# Patient Record
Sex: MALE | Race: WHITE
[De-identification: names, ages, dates, MRNs, and addresses within clinical notes are randomized per-mention and may not be internally consistent; named-entity substitution may affect disease eponyms.]

---

## 2019-11-27 ENCOUNTER — HOSPITAL ENCOUNTER (OUTPATIENT)
Dept: HOSPITAL 46 - ED | Age: 39
End: 2019-11-27
Attending: SURGERY
Payer: COMMERCIAL

## 2019-11-27 VITALS — HEIGHT: 68 IN | WEIGHT: 180.01 LBS | BODY MASS INDEX: 27.28 KG/M2

## 2019-11-27 DIAGNOSIS — K42.9: ICD-10-CM

## 2019-11-27 DIAGNOSIS — Z87.891: ICD-10-CM

## 2019-11-27 DIAGNOSIS — B19.20: ICD-10-CM

## 2019-11-27 DIAGNOSIS — K80.12: Primary | ICD-10-CM

## 2019-11-27 DIAGNOSIS — F41.9: ICD-10-CM

## 2019-11-27 DIAGNOSIS — K74.60: ICD-10-CM

## 2019-11-27 DIAGNOSIS — K21.9: ICD-10-CM

## 2019-11-27 PROCEDURE — 0FT44ZZ RESECTION OF GALLBLADDER, PERCUTANEOUS ENDOSCOPIC APPROACH: ICD-10-PCS | Performed by: SURGERY

## 2019-11-27 NOTE — NUR
11/27/19 1330 Sheets,[place]
1318 PT ARRIVED TO PACU ON 6L VIA MASK, RESP EVEN AND UNLABORED. ORAL
AIRWAY IN PLACE. 4 LAP SITES NOTED AND ON ONE CEASAR DRAIN OUT OF ONE LAP
SITE.

## 2019-11-27 NOTE — OR
[facility]
                                    [address], [place]  [address]
_________________________________________________________________________________________
                                                                 Signed   
 
 
DATE OF OPERATION:
11/27/2019
 
SURGEON:
Angelo Mantilla MD
 
PREOPERATIVE DIAGNOSIS:
Acute-on-chronic cholecystitis and cholelithiasis.
 
POSTOPERATIVE DIAGNOSES:
1. Acute-on-chronic cholecystitis and cholelithiasis.
2. Umbilical hernia, 7 mm.
 
PROCEDURES PERFORMED:
1. Laparoscopic cholecystectomy without intraoperative cholangiogram (prolonged and
difficult). 
2. Subhepatic drain placement.
3. Primary umbilical hernia repair.
 
ESTIMATED BLOOD LOSS:
Minimal.
 
FINDINGS:
Mann had a gallbladder twice as long as normal.  It was quite dilated and full of
clear mucoid fluid.  He did have two large gallstones.  One was obstructing the neck of
the gallbladder.  He is slight of build and his gallbladder was extremely deep
underneath the liver.  We could not elevate his gallbladder up over the edge of the
liver in this regard.  Consequently, we had to add a fan retractor through the
epigastric midline and bring in an extra nurse to help hold retraction.  He has been on
Toradol for three days and he did bleed from his trocar sites and a little bit around
the dissection of triangle of Calot.  All these things combined to make his procedure
prolonged and difficult.  Because of the bleeding, they had a triangle of Calot in the
depth of the gallbladder underneath the liver.  We abandoned the intraoperative
cholangiogram.  Consequently, we did leave a subhepatic drain in place.  Indeed, the
gallbladder proved to be quite thickened and quite edematous as well. 
 
INDICATIONS:
Mann is a 39-year-old gentleman from our [facility].  He
has a prior history hepatitis C virus with associated cirrhosis of the liver.  He was
treated for the hepatitis C and apparently the viral count is not detectable.  He is
known to have at least one if not two stones in his gallbladder from prior ultrasounds.
The last three days he has had significant right upper quadrant abdominal pain with
 
    Electronically Signed By: ANGELO MANTILLA MD  11/27/19 2229
_________________________________________________________________________________________
PATIENT NAME:     MANN LANE                    
MEDICAL RECORD #: [id number]            OPERATIVE REPORT              
          ACCT #: [id number]  
DATE OF BIRTH:   [birth year]            REPORT #: [id number]      
PHYSICIAN:        ANGELO MANTILLA MD             
PCP:              CHAVEZ STORY MD              
REPORT IS CONFIDENTIAL AND NOT TO BE RELEASED WITHOUT AUTHORIZATION
 
 
                                  [facility]
                                    [address], [place]  [address]
_________________________________________________________________________________________
                                                                 Signed   
 
 
nausea.  He has been on Toradol to help in that regard.  His primary care provider
called me this morning and we had him come into the emergency room.  He is feeling much
better with some Dilaudid.  His vital signs were stable.  His white count was 10.2, and
liver function tests were fine.  His last ultrasound was just over a year ago.  We went
ahead and repeated it today.  He has a 1.9 cm stone and a 1.7 cm stone in the
gallbladder.  One is stuck in the neck of the gallbladder.  The gallbladder wall
slightly thickened around 4 mm.  The common bile duct was unremarkable.  There was no
concern about the hepatic parenchyma.  There was no evidence of any portal hypertension.
 No pericholecystic fluid.  I had met with Mann in the emergency room.  I reviewed all
this with him in detail.  We reviewed the location and function of the gallbladder which
he is quite familiar with.  We reviewed laparoscopic versus open cholecystectomy.  He
understands expected intraop and postop course.  We did review the risks including, but
not limited to bleeding, infection, scarring, change in contour of the skin, damage to
bowel, damage to the main bile duct, incisional hernias and other unforeseen
comorbidities.  He had expressed understanding and wished to proceed. 
 
PROCEDURE NOTE:
Sridhar was taken in the operating room and placed in the supine position under general
endotracheal tube anesthesia.  He was given preoperative antibiotics along with
subcutaneous heparin.  SCDs were utilized.  He was then prepped and draped in usual
sterile fashion.  All trocars were placed in their usual positions under direct
visualization of the camera without difficulty.  We realized that he had a small
umbilical hernia.  Consequently, we placed our Erlinda trocar through the umbilical
fascial defect.  After this, the gallbladder was grasped and elevated in the right upper
quadrant.  We realized immediately he had a very dilated thickened very long
gallbladder.  It was extremely deep underneath the liver.  We simply could not bring the
gallbladder up and out over the edge of the liver.  We did enter the gallbladder near
the fundus.  We suctioned out all the thick mucoid fluid.  Even then we could not get
the gallbladder up elevated enough to completely dissect out the triangle of Calot.
Consequently, we had to add the fan retractor through his mid epigastric midline and
have an additional nurse scrub and to hold the fan retractor so that the colon will be
down and out of the way.  We carefully dissected out the triangle of Calot.  We noticed
he had a little more bleeding than usual from his trocar sites as well as the
dissection.  Consequently, we abandoned our intraoperative cholangiogram and went ahead
and used two clips across the cystic duct and divided that sharply.  We then placed 2
clips across the cystic artery and it was divided as well.  It then took us extra time
to very carefully and slowly dissect out the gallbladder from deep underneath the liver
with the help of the cautery.  In the end, the case took twice as long as usual.  He had
a significant amount of edema to the gallbladder wall and the gallbladder wall was
thickened.  In due time, we had the gallbladder free and placed into our EndoCatch bag.
The right upper quadrant was then irrigated and suctioned out until clear.  We used a #7
flat Shade drain.  We brought that into the subhepatic space and held it in place at the
 
    Electronically Signed By: ANGELO MANTILLA MD  11/27/19 2229
_________________________________________________________________________________________
PATIENT NAME:     MANN LANE                    
MEDICAL RECORD #: [id number]            OPERATIVE REPORT              
          ACCT #: [id number]  
DATE OF BIRTH:   [birth year]            REPORT #: [id number]      
PHYSICIAN:        ANGELO MANTILLA MD             
PCP:              CHAVEZ STORY MD              
REPORT IS CONFIDENTIAL AND NOT TO BE RELEASED WITHOUT AUTHORIZATION
 
 
                                  [facility]
                                    [address], [place]  [address]
_________________________________________________________________________________________
                                                                 Signed   
 
 
right most lateral 5 mm subcostal trocar site with a 2-0 nylon suture.  We used our
laparoscopic suturing device and then to pass 0 Vicryl suture on either side of the
fascia of the subxiphoid and mid epigastric trocar sites and we closed those down
primarily.  After this all the gas was allowed to escape and we had enlarged the
umbilical fascial defect about 4 mm maybe 5 at the most to bring the gallbladder out
with the stones.  The gallbladder was opened on the back table by our circulating nurse
and again pictures were taken for photodocumentation.  We closed his umbilical fascial
defect transversely with a running #1 prolene suture.  The umbilical skin was held down
the midline fascia with an interrupted 2-0 PDS suture.  Local anesthetic was injected
into all trocar sites.  Each trocar site was irrigated and suctioned out until clear.
The skin and dermis of each trocar site was closed with interrupted 3-0 subcuticular
Monocryl and sutures.  Dry gauze and tape were applied to 
all incisions.  After this, Mann was awakened from his anesthesia, extubated in the
OR, and taken to recovery room in stable condition. 
 
 
 
            ________________________________________
            Angelo Mantilla MD 
 
 
ALB/MODL
Job #:  [id number]
DD:  11/27/2019 13:31:37
DT:  11/27/2019 18:34:01
 
cc:            MD Chavez Martinez MD
 
 
Copies:  ANGELO MANTILLA MD, LELAND MD
~
 
 
 
 
 
 
 
 
 
    Electronically Signed By: ANGELO MANTILLA MD  11/27/19 2229
_________________________________________________________________________________________
PATIENT NAME:     MANN LANE LAUREN                    
MEDICAL RECORD #: [id number]            OPERATIVE REPORT              
          ACCT #: [id number]  
DATE OF BIRTH:   [birth year]            REPORT #: [id number]      
PHYSICIAN:        ANGELO MANTILLA MD             
PCP:              CHAVEZ STORY MD              
REPORT IS CONFIDENTIAL AND NOT TO BE RELEASED WITHOUT AUTHORIZATION

## 2019-11-27 NOTE — CONS
[facility]
                                    [address], [place]  [address]
_________________________________________________________________________________________
                                                                 Signed   
 
 
DATE OF CONSULTATION:
11/27/2019
 
CHIEF COMPLAINT:
Right upper quadrant abdominal pain.
 
HISTORY OF PRESENT ILLNESS:
Mann is a 39-year-old gentleman from our [facility] [place].  He
was treated for his hepatitis C virus and said his viral counts went down to zero.  He
said he is known to have some mild cirrhosis of the liver.  He is known to have
cholelithiasis.  He said the last 3 days he has been having right upper quadrant
abdominal pain with nausea.  His primary care provider called me this morning and we had
him come over the emergency room for followup.  He was tender in the right upper
quadrant with a white count of 10.2.  Liver function tests were fine.  Ultrasound was
performed and looks like he has two stones in the gallbladder; one stuck in the neck
with gallbladder wall at 4 mm and a common bile duct unremarkable at 5 mm.  The liver
parenchyma itself looks fine and there is no portal hypertension.  His INR is pending,
but other lab seemed to be fine.  I do not have an albumin level at this point.  I was
asked to see him as a general surgeon on-call here in the emergency room. 
 
PAST MEDICAL HISTORY:
Hepatitis C virus and cirrhosis of the liver.
 
PAST SURGICAL HISTORY:
Laceration of his forehead, nose, lip, and chin repair.
 
SOCIAL HISTORY:
He quit smoking and drinking when he came into prison.  He is single and has twin boys.
He works as a  here in [place], but he is originally from [place] in
[place]. 
 
PRIMARY CARE PROVIDER:
Chavez Story MD.
 
FAMILY HISTORY:
Paternal grandmother had a stroke.  Parents and brothers and sisters all seemed to be
fine. 
 
REVIEW OF SYSTEMS:
He had 10 systems reviewed.  He seemed to be healthy otherwise.
 
MEDICATIONS:
Toradol, the last few days Cipro and Norco.
 
 
    Electronically Signed By: ANGELO MANTILLA MD  11/27/19 2229
_________________________________________________________________________________________
PATIENT NAME:     MANN LANE                    
MEDICAL RECORD #: [id number]            CONSULTATION                  
          ACCT #: [id number]  
DATE OF BIRTH:   [birth year]            REPORT #: [id number]      
PHYSICIAN:        ANGELO MANTILLA MD             
PCP:              CHAVEZ STORY MD              
REPORT IS CONFIDENTIAL AND NOT TO BE RELEASED WITHOUT AUTHORIZATION
 
 
                                  [facility]
                                    [address], [place]  [address]
_________________________________________________________________________________________
                                                                 Signed   
 
 
PHYSICAL EXAMINATION:
VITAL SIGNS:  Blood pressure 132/80, heart rate 57, respiratory rate 16, temperature is
99.2 degrees, 99% on room air.  He is 5 feet 8 inches and 81 kg. 
GENERAL:  Mann is a 39-year-old gentleman, who appears healthy and at his stated age.
He is sitting supine semi-recumbent in his ER bed with his two officers with him.  He
does not appear jaundiced.  He is alert, awake, and interactive.  He is not systemically
ill or toxic. 
RESPIRATORY SYSTEM:  His lungs are generally clear to auscultation. 
HEART:  Regular rate and rhythm. 
ABDOMEN:  Generally soft and flat with very mild tenderness in the right upper quadrant.
 He told me it is much better after the pain medication. 
 
LABORATORY DATA:
His white blood cell count 10.2, neutrophils 75, hemoglobin 15.  BUN 11, creatinine
0.78.  Total bilirubin 0.5, AST 19, ALT 20, alkaline phosphatase 56, albumin is 4.3,
lipase 12. 
 
RADIOGRAPHIC STUDIES:
Ultrasound of the right upper quadrant shows normal liver parenchyma without any portal
hypertension.  He has at least two stones in the gallbladder; one measuring 1.9 cm at
the fundus, the other 1.7 cm in the neck, gallbladder walls at 4 mm and common bile duct
about 5 mm.  There is no pericholecystic fluid. 
 
ASSESSMENT AND PLAN:
Mann is a 39-year-old gentleman, who presents with what appears to be acute-on-chronic
cholecystitis and cholelithiasis.  He certainly has biliary colic apparently from the
stones stuck in the neck of his gallbladder.  I have reviewed with Mann the location
and function of the gallbladder.  He is actually quite familiar with that.  We reviewed
his hepatitis C.  We reviewed laparoscopic versus open cholecystectomy.  He understands
expected intraop and postop course.  There is risk to surgery including, but not limited
to bleeding, infection, scarring, change in contour of the skin, damage to bowel, damage
to main bile duct, incisional hernias, and other unforeseen comorbidities.  He has
expressed understanding and would like to proceed.  Unfortunately, he has been on
Toradol for at least 3 days, so we are going to abandon any needle biopsies of the
liver, although we will take plenty of pictures for him.  He has expressed understanding
and wishes to proceed. 
 
 
 
            ________________________________________
            Angelo Mantilla MD 
 
 
ALB/MODL
 
    Electronically Signed By: ANGELO MANTILLA MD  11/27/19 2229
_________________________________________________________________________________________
PATIENT NAME:     MANN LANE                    
MEDICAL RECORD #: [id number]            CONSULTATION                  
          ACCT #: [id number]  
DATE OF BIRTH:   [birth year]            REPORT #: [id number]      
PHYSICIAN:        ANGELO MANTILLA MD             
PCP:              CHAVEZ STORY MD              
REPORT IS CONFIDENTIAL AND NOT TO BE RELEASED WITHOUT [facility]
                                    [address], [place]  [address]
_________________________________________________________________________________________
                                                                 Signed   
 
 
Job #:  [id number]
DD:  11/27/2019 11:06:12
DT:  11/27/2019 17:54:39
 
cc:            MD Chavez Martinez MD
 
 
Copies:  ANGELO MANTILLA MD, LELAND MD
~
 
 
 
 
 
 
 
 
 
 
 
 
 
 
 
 
 
 
 
 
 
 
 
 
 
 
 
 
 
 
 
 
 
    Electronically Signed By: ANGELO MANTILLA MD  11/27/19 2229
_________________________________________________________________________________________
PATIENT NAME:     DOMINGOMANN LAUREN                    
MEDICAL RECORD #: [id number]            CONSULTATION                  
          ACCT #: [id number]  
DATE OF BIRTH:   [birth year]            REPORT #: [id number]      
PHYSICIAN:        ANGELO MANTILLA MD             
PCP:              CHAVEZ STORY MD              
REPORT IS CONFIDENTIAL AND NOT TO BE RELEASED WITHOUT AUTHORIZATION